# Patient Record
Sex: FEMALE | Race: ASIAN | NOT HISPANIC OR LATINO
[De-identification: names, ages, dates, MRNs, and addresses within clinical notes are randomized per-mention and may not be internally consistent; named-entity substitution may affect disease eponyms.]

---

## 2018-07-11 ENCOUNTER — APPOINTMENT (OUTPATIENT)
Dept: RADIOLOGY | Facility: IMAGING CENTER | Age: 53
End: 2018-07-11
Payer: COMMERCIAL

## 2018-07-11 ENCOUNTER — OUTPATIENT (OUTPATIENT)
Dept: OUTPATIENT SERVICES | Facility: HOSPITAL | Age: 53
LOS: 1 days | End: 2018-07-11
Payer: COMMERCIAL

## 2018-07-11 DIAGNOSIS — Z00.00 ENCOUNTER FOR GENERAL ADULT MEDICAL EXAMINATION WITHOUT ABNORMAL FINDINGS: ICD-10-CM

## 2018-07-11 PROCEDURE — 77080 DXA BONE DENSITY AXIAL: CPT

## 2018-07-11 PROCEDURE — 77080 DXA BONE DENSITY AXIAL: CPT | Mod: 26

## 2018-08-07 ENCOUNTER — APPOINTMENT (OUTPATIENT)
Dept: OBGYN | Facility: CLINIC | Age: 53
End: 2018-08-07
Payer: COMMERCIAL

## 2018-08-07 VITALS
DIASTOLIC BLOOD PRESSURE: 82 MMHG | HEART RATE: 65 BPM | WEIGHT: 121 LBS | BODY MASS INDEX: 20.66 KG/M2 | HEIGHT: 64 IN | SYSTOLIC BLOOD PRESSURE: 147 MMHG

## 2018-08-07 DIAGNOSIS — Z01.419 ENCOUNTER FOR GYNECOLOGICAL EXAMINATION (GENERAL) (ROUTINE) W/OUT ABNORMAL FINDINGS: ICD-10-CM

## 2018-08-07 PROCEDURE — 99396 PREV VISIT EST AGE 40-64: CPT

## 2018-08-22 LAB
CYTOLOGY CVX/VAG DOC THIN PREP: NORMAL
HPV HIGH+LOW RISK DNA PNL CVX: NOT DETECTED

## 2019-10-24 ENCOUNTER — APPOINTMENT (OUTPATIENT)
Dept: OBGYN | Facility: CLINIC | Age: 54
End: 2019-10-24
Payer: COMMERCIAL

## 2019-10-24 VITALS
BODY MASS INDEX: 20.49 KG/M2 | WEIGHT: 120 LBS | HEART RATE: 71 BPM | HEIGHT: 64 IN | SYSTOLIC BLOOD PRESSURE: 124 MMHG | DIASTOLIC BLOOD PRESSURE: 76 MMHG

## 2019-10-24 DIAGNOSIS — Z12.39 ENCOUNTER FOR OTHER SCREENING FOR MALIGNANT NEOPLASM OF BREAST: ICD-10-CM

## 2019-10-24 DIAGNOSIS — N95.1 MENOPAUSAL AND FEMALE CLIMACTERIC STATES: ICD-10-CM

## 2019-10-24 DIAGNOSIS — Z82.69 FAMILY HISTORY OF OTHER DISEASES OF THE MUSCULOSKELETAL SYSTEM AND CONNECTIVE TISSUE: ICD-10-CM

## 2019-10-24 DIAGNOSIS — N95.2 POSTMENOPAUSAL ATROPHIC VAGINITIS: ICD-10-CM

## 2019-10-24 DIAGNOSIS — Z87.42 PERSONAL HISTORY OF OTHER DISEASES OF THE FEMALE GENITAL TRACT: ICD-10-CM

## 2019-10-24 DIAGNOSIS — R92.2 INCONCLUSIVE MAMMOGRAM: ICD-10-CM

## 2019-10-24 DIAGNOSIS — Z01.419 ENCOUNTER FOR GYNECOLOGICAL EXAMINATION (GENERAL) (ROUTINE) W/OUT ABNORMAL FINDINGS: ICD-10-CM

## 2019-10-24 PROCEDURE — 99396 PREV VISIT EST AGE 40-64: CPT

## 2019-10-24 RX ORDER — CARVEDILOL 12.5 MG/1
12.5 TABLET, FILM COATED ORAL
Refills: 0 | Status: ACTIVE | COMMUNITY

## 2019-10-24 NOTE — COUNSELING
[Breast Self Exam] : breast self exam [Nutrition] : nutrition [Vitamins/Supplements] : vitamins/supplements [Exercise] : exercise [STD (testing, results, tx)] : STD (testing, results, tx) [Other ___] : [unfilled]

## 2019-10-24 NOTE — HISTORY OF PRESENT ILLNESS
[1 Year Ago] : 1 year ago [Sexually Active] : is sexually active [Good] : being in good health [Monogamous] : is monogamous [NA] : N/A [Male ___] : [unfilled] male

## 2019-10-24 NOTE — PHYSICAL EXAM
[Awake] : awake [Alert] : alert [Soft] : soft [Oriented x3] : oriented to person, place, and time [None] : no CVA tenderness [Normal] : uterus [Pap Obtained] : a Pap smear was performed [No Bleeding] : there was no active vaginal bleeding [Anteversion] : anteverted [No Tenderness] : no rectal tenderness [Uterine Adnexae] : were not tender and not enlarged [Nl Sphincter Tone] : normal sphincter tone [Acute Distress] : no acute distress [LAD] : no lymphadenopathy [Thyroid Nodule] : no thyroid nodule [Goiter] : no goiter [Mass] : no breast mass [Nipple Discharge] : no nipple discharge [Axillary LAD] : no axillary lymphadenopathy [Tender] : non tender [Distended] : not distended [Depressed Mood] : not depressed [FreeTextEntry9] : no masses, no nodules

## 2021-11-19 ENCOUNTER — RESULT REVIEW (OUTPATIENT)
Age: 56
End: 2021-11-19

## 2023-06-22 ENCOUNTER — APPOINTMENT (OUTPATIENT)
Dept: ULTRASOUND IMAGING | Facility: CLINIC | Age: 58
End: 2023-06-22
Payer: COMMERCIAL

## 2023-06-22 ENCOUNTER — APPOINTMENT (OUTPATIENT)
Dept: ULTRASOUND IMAGING | Facility: CLINIC | Age: 58
End: 2023-06-22

## 2023-06-22 ENCOUNTER — OUTPATIENT (OUTPATIENT)
Dept: OUTPATIENT SERVICES | Facility: HOSPITAL | Age: 58
LOS: 1 days | End: 2023-06-22
Payer: COMMERCIAL

## 2023-06-22 DIAGNOSIS — R14.0 ABDOMINAL DISTENSION (GASEOUS): ICD-10-CM

## 2023-06-22 DIAGNOSIS — R10.2 PELVIC AND PERINEAL PAIN: ICD-10-CM

## 2023-06-22 PROCEDURE — 76830 TRANSVAGINAL US NON-OB: CPT

## 2023-06-22 PROCEDURE — 76700 US EXAM ABDOM COMPLETE: CPT

## 2023-06-22 PROCEDURE — 76856 US EXAM PELVIC COMPLETE: CPT | Mod: 26

## 2023-06-22 PROCEDURE — 76856 US EXAM PELVIC COMPLETE: CPT

## 2023-06-22 PROCEDURE — 76830 TRANSVAGINAL US NON-OB: CPT | Mod: 26

## 2023-06-22 PROCEDURE — 76700 US EXAM ABDOM COMPLETE: CPT | Mod: 26

## 2023-06-24 ENCOUNTER — TRANSCRIPTION ENCOUNTER (OUTPATIENT)
Age: 58
End: 2023-06-24

## 2024-08-05 ENCOUNTER — NON-APPOINTMENT (OUTPATIENT)
Age: 59
End: 2024-08-05

## 2024-08-21 ENCOUNTER — APPOINTMENT (OUTPATIENT)
Dept: SURGICAL ONCOLOGY | Facility: CLINIC | Age: 59
End: 2024-08-21
Payer: COMMERCIAL

## 2024-08-21 ENCOUNTER — NON-APPOINTMENT (OUTPATIENT)
Age: 59
End: 2024-08-21

## 2024-08-21 VITALS
HEART RATE: 66 BPM | SYSTOLIC BLOOD PRESSURE: 146 MMHG | HEIGHT: 64.5 IN | BODY MASS INDEX: 20.24 KG/M2 | OXYGEN SATURATION: 99 % | DIASTOLIC BLOOD PRESSURE: 79 MMHG | WEIGHT: 120 LBS

## 2024-08-21 DIAGNOSIS — N60.99 UNSPECIFIED BENIGN MAMMARY DYSPLASIA OF UNSPECIFIED BREAST: ICD-10-CM

## 2024-08-21 PROCEDURE — 99205 OFFICE O/P NEW HI 60 MIN: CPT

## 2024-08-21 NOTE — CONSULT LETTER
[Dear  ___] : Dear  [unfilled], [Consult Letter:] : I had the pleasure of evaluating your patient, [unfilled]. [Please see my note below.] : Please see my note below. [Sincerely,] : Sincerely, [FreeTextEntry3] : Murray Arguelles MD FACS

## 2024-08-21 NOTE — PHYSICAL EXAM
[Normal] : supple, no neck mass and thyroid not enlarged [Normal Neck Lymph Nodes] : normal neck lymph nodes  [Normal Supraclavicular Lymph Nodes] : normal supraclavicular lymph nodes [Normal Groin Lymph Nodes] : normal groin lymph nodes [Normal Axillary Lymph Nodes] : normal axillary lymph nodes [Normal] : oriented to person, place and time, with appropriate affect [de-identified] : no masses or adenopathy bilaterally

## 2024-08-21 NOTE — ADDENDUM
[FreeTextEntry1] : I, Diamante Islas, acted solely as a scribe for Dr. Murray Arguelles on this date 08/21/2024.

## 2024-08-21 NOTE — HISTORY OF PRESENT ILLNESS
[de-identified] : Patient is a 57 y/o F who presents chief complaint of left breast ALH. Denies palpable breast masses, nipple discharge, nipple retraction/inversion, or skin changes.   Life time risk using HAIDER model: 10.9%  Stereotactic bx 7/10/24- Left breast posterior/central -Atypical lobular hyperplasia, rare foci in a background of benign breast tissue -Intraluminal calcifications associated with benign breast tissue -Concordant   L MMG 6/19/24-New heterogeneous calcifications within the posterior and central left breast, rec stereotactic bx (BIRADS 4C)  Screening mmg/us 5/20/24- New calcifications L breast, additional eval advised.  FMHx: Paternal aunt had breast cancer @age 65, reportedly positive for BRCA. Paternal cousin had DCIS, BRCA+. 2nd paternal cousin also had breast ca and then developed metastatic ca.  Father passed from aneurysm @age 62. Paternal uncle had stomach cancer?  PMHx: Osteopenia

## 2024-08-21 NOTE — HISTORY OF PRESENT ILLNESS
[de-identified] : Patient is a 59 y/o F who presents chief complaint of left breast ALH. Denies palpable breast masses, nipple discharge, nipple retraction/inversion, or skin changes.   Life time risk using HAIDER model: 10.9%  Stereotactic bx 7/10/24- Left breast posterior/central -Atypical lobular hyperplasia, rare foci in a background of benign breast tissue -Intraluminal calcifications associated with benign breast tissue -Concordant   L MMG 6/19/24-New heterogeneous calcifications within the posterior and central left breast, rec stereotactic bx (BIRADS 4C)  Screening mmg/us 5/20/24- New calcifications L breast, additional eval advised.  FMHx: Paternal aunt had breast cancer @age 65, reportedly positive for BRCA. Paternal cousin had DCIS, BRCA+. 2nd paternal cousin also had breast ca and then developed metastatic ca.  Father passed from aneurysm @age 62. Paternal uncle had stomach cancer?  PMHx: Osteopenia

## 2024-08-21 NOTE — PHYSICAL EXAM
[Normal] : supple, no neck mass and thyroid not enlarged [Normal Neck Lymph Nodes] : normal neck lymph nodes  [Normal Supraclavicular Lymph Nodes] : normal supraclavicular lymph nodes [Normal Groin Lymph Nodes] : normal groin lymph nodes [Normal Axillary Lymph Nodes] : normal axillary lymph nodes [Normal] : oriented to person, place and time, with appropriate affect [de-identified] : no masses or adenopathy bilaterally

## 2024-08-21 NOTE — ASSESSMENT
[FreeTextEntry1] : S/p concordant stereotactic bx L breast- ALH Discussed that there is no indication of surgical resection  Will order baseline breast MRI Will refer pt to high risk clinic regarding risk reduction endocrine therapy  Genetic testing performed today given strong family hx

## 2024-08-30 ENCOUNTER — OUTPATIENT (OUTPATIENT)
Dept: OUTPATIENT SERVICES | Facility: HOSPITAL | Age: 59
LOS: 1 days | End: 2024-08-30
Payer: COMMERCIAL

## 2024-08-30 DIAGNOSIS — C80.1 MALIGNANT (PRIMARY) NEOPLASM, UNSPECIFIED: ICD-10-CM

## 2024-08-30 PROCEDURE — 88321 CONSLTJ&REPRT SLD PREP ELSWR: CPT

## 2024-09-09 LAB — SURGICAL PATHOLOGY STUDY: SIGNIFICANT CHANGE UP

## 2024-09-18 ENCOUNTER — OUTPATIENT (OUTPATIENT)
Dept: OUTPATIENT SERVICES | Facility: HOSPITAL | Age: 59
LOS: 1 days | Discharge: HOME | End: 2024-09-18

## 2024-09-18 DIAGNOSIS — N60.99 UNSPECIFIED BENIGN MAMMARY DYSPLASIA OF UNSPECIFIED BREAST: ICD-10-CM

## 2024-09-26 ENCOUNTER — NON-APPOINTMENT (OUTPATIENT)
Age: 59
End: 2024-09-26

## 2024-09-27 ENCOUNTER — APPOINTMENT (OUTPATIENT)
Dept: HEMATOLOGY ONCOLOGY | Facility: CLINIC | Age: 59
End: 2024-09-27

## 2024-09-27 VITALS
OXYGEN SATURATION: 100 % | WEIGHT: 119.05 LBS | BODY MASS INDEX: 19.36 KG/M2 | HEIGHT: 65.75 IN | HEART RATE: 65 BPM | SYSTOLIC BLOOD PRESSURE: 124 MMHG | RESPIRATION RATE: 16 BRPM | DIASTOLIC BLOOD PRESSURE: 77 MMHG

## 2024-09-27 DIAGNOSIS — N60.99 UNSPECIFIED BENIGN MAMMARY DYSPLASIA OF UNSPECIFIED BREAST: ICD-10-CM

## 2024-09-27 DIAGNOSIS — Z91.89 OTHER SPECIFIED PERSONAL RISK FACTORS, NOT ELSEWHERE CLASSIFIED: ICD-10-CM

## 2024-09-27 PROCEDURE — 99205 OFFICE O/P NEW HI 60 MIN: CPT

## 2024-09-27 RX ORDER — LOSARTAN POTASSIUM AND HYDROCHLOROTHIAZIDE 12.5; 5 MG/1; MG/1
50-12.5 TABLET ORAL
Refills: 0 | Status: ACTIVE | COMMUNITY
Start: 2024-09-27

## 2024-10-03 NOTE — REVIEW OF SYSTEMS
[Diarrhea: Grade 0] : Diarrhea: Grade 0 [Vaginal Discharge] : vaginal discharge [Negative] : Allergic/Immunologic

## 2024-10-04 PROBLEM — Z91.89 AT HIGH RISK FOR BREAST CANCER: Status: ACTIVE | Noted: 2024-10-04

## 2024-10-04 NOTE — HISTORY OF PRESENT ILLNESS
[de-identified] : Ms. RAS DE LA ROSA is 58 year female referred by BECKA ARGUELLES MD for breast cancer risk assessment and risk reduction management based on Her family history and/or personal risk factors for breast cancer.   Ms. DE LA ROSA initially presented at the time of her screening mammogram/US 2024  when suspicious findings in the left breast led to a diagnostic workup and biopsy that ultimately demonstratedatypical LOBULAR hyperplasia.    She is being conservatively managed by BECKA ARGUELLES MD  with close imaging follow-up planned; no surgical excision recommended at this time.   RISK ASSESSMENT FACTORS:   GENERAL: Height: 64 inches Weight  120 lbs BMI:20  Age of Menarche:   12 Menopausal Status: Postmenopausal OCP/HRT/Fertility:  no Age of Menopause 55 # pregnancies/live birth:   Age of first live birth:  28                                                                                          BREAST RISK FACTORS:                                                                                     # Breast Biopsies:          1                                                                 Results of biopsies: : L. ALH (rare incidental foci) Breast Density:  diffusely dense   FAMILY HISTORY:  A complete family history was obtained including first and second degree relatives - see pedigree  Personal Genetic Testing Results if applicable:  Negative*   Miller  (true negative) Family Genetic Testing Results:  Paternal BRCA2 mutation    SOCIAL HISTORY:   Patient denies history of smoking and regular ETOH use.   Does not have a regular exercise routine   IMAGING SUMMARY: Mammogram:  2024 Breast Ultrasound: 2024 Breast MRI:  Pending?? Other Screening:  BMD --> reported she has osteopenia    HEALTH TEAM: PCP:  Dr. Ronnie Rain GYN: Dr. Camille Palomino BREAST SURGEON: Dr Arguelles        [de-identified] : 9/27/2024 [id number]  denies any breast masses, breast tenderness, skin changes or nipple discharge. c/o vaginal dryness

## 2024-10-04 NOTE — ASSESSMENT
[FreeTextEntry1] : RAS  is a 58 year   year  year old female who presented today to review her risk of developing of breast cancer based on her family history and personal risk factors.  A complete risk assessment was performed and the results suggest that RAS   is at increased risk for breast cancer.  Ms. DE LA ROSA was diagnosed with atypical lobular hyperplasia.   We discussed that atypical lobular hyperplasia is a high-risk lesion and confers a substantial increase in the risk of subsequent breast cancer in both ipsilateral and contralateral breast.  Atypical lobular hyperplasia is associated with 1-2% annual cumulative risk of developing invasive breast cancer. Given the atypical hyperplasia and high breast cancer risk score, she is a candidate for primary risk reduction medication.  Options for risk reduction medication in a post-menopausal patient include aromatase inhibitors, raloxifene, tamoxifen.   Given that RAS   is postmenopausal and her bone density demonstrates only mild osteopenia, we have recommended risk reduction with raloxifene 60 mg daily x 5 years  Studies examining raloxifene as a chemoprevention demonstrated a 50% reduction in risk of invasive breast cancer. Raloxifene's side effects include hot flashes, peripheral edema, leg cramps and a slight increased risk of venousthrombolic effects (0.7% raloxifene group vs. 0.2% placebo).  There is no increased risk of endometrial cancer associated with raloxifene.   Empiric Risk Calculations: Tyrer [place] Score:  27-30 % LIFETIME risk of developing breast cancer (general population risk 12%) Christine Risk Score: 3 %  5-year Risk (compared to 1 % for women same age/race)   Based on this information the following risk reduction/screening plan has been recommended:   RISK REDUCTION PLAN: - Annual mammogram and breast ultrasound - L. Diagnostic Breast Mammo - due 11/2024; Then annually at 5/2025  - Baseline Breast MRI:  DUE - TBD (f/up with Dr. Arguelles's office to check on auth) - Genetics:  Negative (true negative in family w/BRCA mutation) - BMD - need copy** reportedly osteopenia  - Chemoprevention: - recommend Raloxifene - patient to consider her options - Lifestyle Recommendations:  Limit consumption of alcoholic beverages to no more than one drink equivalent per day; Exercise 150-300 minutes of moderate intensity physical activity per week; Maintain a healthy body weight of 20-25 BMI to help reduce risk of breast cancer.  - continue f/up with gynecologist and PCP for routine medical care. - continue f/up with breast surgeon as recommended - f/up with [facility] in 4 weeks via TEB to finalize treatment plan

## 2024-10-04 NOTE — ASSESSMENT
[FreeTextEntry1] : RAS  is a 58 year   year  year old female who presented today to review her risk of developing of breast cancer based on her family history and personal risk factors.  A complete risk assessment was performed and the results suggest that RAS   is at increased risk for breast cancer.  Ms. DE LA ROSA was diagnosed with atypical lobular hyperplasia.   We discussed that atypical lobular hyperplasia is a high-risk lesion and confers a substantial increase in the risk of subsequent breast cancer in both ipsilateral and contralateral breast.  Atypical lobular hyperplasia is associated with 1-2% annual cumulative risk of developing invasive breast cancer. Given the atypical hyperplasia and high breast cancer risk score, she is a candidate for primary risk reduction medication.  Options for risk reduction medication in a post-menopausal patient include aromatase inhibitors, raloxifene, tamoxifen.   Given that RAS   is postmenopausal and her bone density demonstrates only mild osteopenia, we have recommended risk reduction with raloxifene 60 mg daily x 5 years  Studies examining raloxifene as a chemoprevention demonstrated a 50% reduction in risk of invasive breast cancer. Raloxifene's side effects include hot flashes, peripheral edema, leg cramps and a slight increased risk of venousthrombolic effects (0.7% raloxifene group vs. 0.2% placebo).  There is no increased risk of endometrial cancer associated with raloxifene.   Empiric Risk Calculations: Tyrer [place] Score:  27-30 % LIFETIME risk of developing breast cancer (general population risk 12%) Christine Risk Score: 3 %  5-year Risk (compared to 1 % for women same age/race)   Based on this information the following risk reduction/screening plan has been recommended:   RISK REDUCTION PLAN: - Annual mammogram and breast ultrasound - L. Diagnostic Breast Mammo - due 11/2024; Then annually at 5/2025  - Baseline Breast MRI:  DUE - TBD (f/up with Dr. Arguelles's office to check on auth) - Genetics:  Negative (true negative in family w/BRCA mutation) - BMD - need copy** reportedly osteopenia  - Chemoprevention: - recommend Raloxifene - patient to consider her options - Lifestyle Recommendations:  Limit consumption of alcoholic beverages to no more than one drink equivalent per day; Exercise 150-300 minutes of moderate intensity physical activity per week; Maintain a healthy body weight of 20-25 BMI to help reduce risk of breast cancer.  - continue f/up with gynecologist and PCP for routine medical care. - continue f/up with breast surgeon as recommended - f/up with [facility] in 4 weeks via TEB to finalize treatment plan

## 2024-10-04 NOTE — HISTORY OF PRESENT ILLNESS
[de-identified] : Ms. RAS DE LA ROSA is 58 year female referred by BECKA ARGUELLES MD for breast cancer risk assessment and risk reduction management based on Her family history and/or personal risk factors for breast cancer.   Ms. DE LA ROSA initially presented at the time of her screening mammogram/US 2024  when suspicious findings in the left breast led to a diagnostic workup and biopsy that ultimately demonstratedatypical LOBULAR hyperplasia.    She is being conservatively managed by BECKA ARGUELLES MD  with close imaging follow-up planned; no surgical excision recommended at this time.   RISK ASSESSMENT FACTORS:   GENERAL: Height: 64 inches Weight  120 lbs BMI:20  Age of Menarche:   12 Menopausal Status: Postmenopausal OCP/HRT/Fertility:  no Age of Menopause 55 # pregnancies/live birth:   Age of first live birth:  28                                                                                          BREAST RISK FACTORS:                                                                                     # Breast Biopsies:          1                                                                 Results of biopsies: : L. ALH (rare incidental foci) Breast Density:  diffusely dense   FAMILY HISTORY:  A complete family history was obtained including first and second degree relatives - see pedigree  Personal Genetic Testing Results if applicable:  Negative*   Miller  (true negative) Family Genetic Testing Results:  Paternal BRCA2 mutation    SOCIAL HISTORY:   Patient denies history of smoking and regular ETOH use.   Does not have a regular exercise routine   IMAGING SUMMARY: Mammogram:  2024 Breast Ultrasound: 2024 Breast MRI:  Pending?? Other Screening:  BMD --> reported she has osteopenia    HEALTH TEAM: PCP:  Dr. Ronnie Rain GYN: Dr. Camille Palomino BREAST SURGEON: Dr Arguelles        [de-identified] : 9/27/2024 [id number]  denies any breast masses, breast tenderness, skin changes or nipple discharge. c/o vaginal dryness

## 2024-10-04 NOTE — REASON FOR VISIT
[Initial Consultation] : an initial consultation [FreeTextEntry2] : atypical LOBULAR hyperplasia/ Breast Cancer Risk Assessment

## 2024-10-04 NOTE — ASSESSMENT
[FreeTextEntry1] : RAS  is a 58 year   year  year old female who presented today to review her risk of developing of breast cancer based on her family history and personal risk factors.  A complete risk assessment was performed and the results suggest that RAS   is at increased risk for breast cancer.  Ms. DE LA ROSA was diagnosed with atypical lobular hyperplasia.   We discussed that atypical lobular hyperplasia is a high-risk lesion and confers a substantial increase in the risk of subsequent breast cancer in both ipsilateral and contralateral breast.  Atypical lobular hyperplasia is associated with 1-2% annual cumulative risk of developing invasive breast cancer. Given the atypical hyperplasia and high breast cancer risk score, she is a candidate for primary risk reduction medication.  Options for risk reduction medication in a post-menopausal patient include aromatase inhibitors, raloxifene, tamoxifen.   Given that RAS   is postmenopausal and her bone density demonstrates only mild osteopenia, we have recommended risk reduction with raloxifene 60 mg daily x 5 years  Studies examining raloxifene as a chemoprevention demonstrated a 50% reduction in risk of invasive breast cancer. Raloxifene's side effects include hot flashes, peripheral edema, leg cramps and a slight increased risk of venousthrombolic effects (0.7% raloxifene group vs. 0.2% placebo).  There is no increased risk of endometrial cancer associated with raloxifene.   Empiric Risk Calculations: Tyrer [place] Score:  27-30 % LIFETIME risk of developing breast cancer (general population risk 12%) Christine Risk Score: 3 %  5-year Risk (compared to 1 % for women same age/race)   Based on this information the following risk reduction/screening plan has been recommended:   RISK REDUCTION PLAN: - Annual mammogram and breast ultrasound - L. Diagnostic Breast Mammo - due 11/2024; Then annually at 5/2025  - Baseline Breast MRI:  DUE - TBD (f/up with Dr. Arguelles's office to check on auth) - Genetics:  Negative (true negative in family w/BRCA mutation) - BMD - need copy** reportedly osteopenia  - Chemoprevention: - recommend Raloxifene - patient to consider her options - Lifestyle Recommendations:  Limit consumption of alcoholic beverages to no more than one drink equivalent per day; Exercise 150-300 minutes of moderate intensity physical activity per week; Maintain a healthy body weight of 20-25 BMI to help reduce risk of breast cancer.  - continue f/up with gynecologist and PCP for routine medical care. - continue f/up with breast surgeon as recommended - f/up with [facility] in 4 weeks via TEB to finalize treatment plan

## 2024-10-04 NOTE — HISTORY OF PRESENT ILLNESS
[de-identified] : Ms. RAS DE LA ROSA is 58 year female referred by BECKA ARGUELLES MD for breast cancer risk assessment and risk reduction management based on Her family history and/or personal risk factors for breast cancer.   Ms. DE LA ROSA initially presented at the time of her screening mammogram/US 2024  when suspicious findings in the left breast led to a diagnostic workup and biopsy that ultimately demonstratedatypical LOBULAR hyperplasia.    She is being conservatively managed by BECKA ARGUELLES MD  with close imaging follow-up planned; no surgical excision recommended at this time.   RISK ASSESSMENT FACTORS:   GENERAL: Height: 64 inches Weight  120 lbs BMI:20  Age of Menarche:   12 Menopausal Status: Postmenopausal OCP/HRT/Fertility:  no Age of Menopause 55 # pregnancies/live birth:   Age of first live birth:  28                                                                                          BREAST RISK FACTORS:                                                                                     # Breast Biopsies:          1                                                                 Results of biopsies: : L. ALH (rare incidental foci) Breast Density:  diffusely dense   FAMILY HISTORY:  A complete family history was obtained including first and second degree relatives - see pedigree  Personal Genetic Testing Results if applicable:  Negative*   Miller  (true negative) Family Genetic Testing Results:  Paternal BRCA2 mutation    SOCIAL HISTORY:   Patient denies history of smoking and regular ETOH use.   Does not have a regular exercise routine   IMAGING SUMMARY: Mammogram:  2024 Breast Ultrasound: 2024 Breast MRI:  Pending?? Other Screening:  BMD --> reported she has osteopenia    HEALTH TEAM: PCP:  Dr. Ronnie Rain GYN: Dr. Camille Palomino BREAST SURGEON: Dr Arguelles        [de-identified] : 9/27/2024 [id number]  denies any breast masses, breast tenderness, skin changes or nipple discharge. c/o vaginal dryness

## 2024-10-28 ENCOUNTER — APPOINTMENT (OUTPATIENT)
Dept: HEMATOLOGY ONCOLOGY | Facility: CLINIC | Age: 59
End: 2024-10-28
Payer: COMMERCIAL

## 2024-10-28 DIAGNOSIS — N60.99 UNSPECIFIED BENIGN MAMMARY DYSPLASIA OF UNSPECIFIED BREAST: ICD-10-CM

## 2024-10-28 DIAGNOSIS — Z91.89 OTHER SPECIFIED PERSONAL RISK FACTORS, NOT ELSEWHERE CLASSIFIED: ICD-10-CM

## 2024-10-28 PROCEDURE — 99213 OFFICE O/P EST LOW 20 MIN: CPT | Mod: 95

## 2024-10-28 RX ORDER — RALOXIFENE HYDROCHLORIDE 60 MG/1
60 TABLET, FILM COATED ORAL
Qty: 30 | Refills: 3 | Status: ACTIVE | COMMUNITY
Start: 2024-10-28 | End: 1900-01-01

## 2024-11-21 ENCOUNTER — APPOINTMENT (OUTPATIENT)
Dept: RHEUMATOLOGY | Facility: CLINIC | Age: 59
End: 2024-11-21
Payer: COMMERCIAL

## 2024-11-21 VITALS
HEART RATE: 60 BPM | SYSTOLIC BLOOD PRESSURE: 136 MMHG | OXYGEN SATURATION: 97 % | BODY MASS INDEX: 19.99 KG/M2 | WEIGHT: 120 LBS | HEIGHT: 65 IN | DIASTOLIC BLOOD PRESSURE: 77 MMHG | RESPIRATION RATE: 16 BRPM

## 2024-11-21 DIAGNOSIS — M25.50 PAIN IN UNSPECIFIED JOINT: ICD-10-CM

## 2024-11-21 PROCEDURE — 99203 OFFICE O/P NEW LOW 30 MIN: CPT

## 2024-11-21 RX ORDER — CHOLECALCIFEROL (VITAMIN D3) 25 MCG
TABLET ORAL
Refills: 0 | Status: ACTIVE | COMMUNITY

## 2024-11-21 RX ORDER — ASPIRIN 81 MG
TABLET,CHEWABLE ORAL
Refills: 0 | Status: ACTIVE | COMMUNITY

## 2024-12-20 ENCOUNTER — APPOINTMENT (OUTPATIENT)
Dept: MAMMOGRAPHY | Facility: IMAGING CENTER | Age: 59
End: 2024-12-20

## 2024-12-20 ENCOUNTER — APPOINTMENT (OUTPATIENT)
Dept: ULTRASOUND IMAGING | Facility: IMAGING CENTER | Age: 59
End: 2024-12-20

## 2025-03-06 ENCOUNTER — OUTPATIENT (OUTPATIENT)
Dept: OUTPATIENT SERVICES | Facility: HOSPITAL | Age: 60
LOS: 1 days | Discharge: HOME | End: 2025-03-06

## 2025-03-06 DIAGNOSIS — N60.99 UNSPECIFIED BENIGN MAMMARY DYSPLASIA OF UNSPECIFIED BREAST: ICD-10-CM

## 2025-03-07 ENCOUNTER — APPOINTMENT (OUTPATIENT)
Dept: HEMATOLOGY ONCOLOGY | Facility: CLINIC | Age: 60
End: 2025-03-07
Payer: COMMERCIAL

## 2025-03-07 ENCOUNTER — NON-APPOINTMENT (OUTPATIENT)
Age: 60
End: 2025-03-07

## 2025-03-07 VITALS
TEMPERATURE: 97.2 F | SYSTOLIC BLOOD PRESSURE: 97 MMHG | WEIGHT: 119.05 LBS | RESPIRATION RATE: 16 BRPM | DIASTOLIC BLOOD PRESSURE: 60 MMHG | HEART RATE: 63 BPM | BODY MASS INDEX: 20.32 KG/M2 | HEIGHT: 64.21 IN

## 2025-03-07 DIAGNOSIS — N60.99 UNSPECIFIED BENIGN MAMMARY DYSPLASIA OF UNSPECIFIED BREAST: ICD-10-CM

## 2025-03-07 PROCEDURE — 99214 OFFICE O/P EST MOD 30 MIN: CPT

## 2025-09-02 ENCOUNTER — RX RENEWAL (OUTPATIENT)
Age: 60
End: 2025-09-02

## 2025-09-18 ENCOUNTER — APPOINTMENT (OUTPATIENT)
Dept: HEMATOLOGY ONCOLOGY | Facility: CLINIC | Age: 60
End: 2025-09-18
Payer: COMMERCIAL

## 2025-09-18 VITALS
DIASTOLIC BLOOD PRESSURE: 75 MMHG | BODY MASS INDEX: 20.53 KG/M2 | SYSTOLIC BLOOD PRESSURE: 117 MMHG | HEART RATE: 60 BPM | TEMPERATURE: 98 F | OXYGEN SATURATION: 97 % | WEIGHT: 120.37 LBS | RESPIRATION RATE: 17 BRPM

## 2025-09-18 DIAGNOSIS — Z91.89 OTHER SPECIFIED PERSONAL RISK FACTORS, NOT ELSEWHERE CLASSIFIED: ICD-10-CM

## 2025-09-18 DIAGNOSIS — N60.99 UNSPECIFIED BENIGN MAMMARY DYSPLASIA OF UNSPECIFIED BREAST: ICD-10-CM

## 2025-09-18 PROCEDURE — 99214 OFFICE O/P EST MOD 30 MIN: CPT

## 2025-09-18 RX ORDER — AMLODIPINE BESYLATE 2.5 MG/1
2.5 TABLET ORAL
Refills: 0 | Status: ACTIVE | COMMUNITY
Start: 2025-09-18